# Patient Record
Sex: FEMALE | Race: WHITE | Employment: FULL TIME | URBAN - METROPOLITAN AREA
[De-identification: names, ages, dates, MRNs, and addresses within clinical notes are randomized per-mention and may not be internally consistent; named-entity substitution may affect disease eponyms.]

---

## 2018-01-10 ENCOUNTER — HOSPITAL ENCOUNTER (OUTPATIENT)
Dept: MRI IMAGING | Age: 68
Discharge: HOME | End: 2018-01-10
Attending: NEUROLOGICAL SURGERY
Payer: MEDICARE

## 2018-01-10 ENCOUNTER — HOSPITAL ENCOUNTER (OUTPATIENT)
Dept: GENERAL RADIOLOGY | Age: 68
Discharge: HOME | End: 2018-01-10
Attending: NEUROLOGICAL SURGERY
Payer: MEDICARE

## 2018-01-10 VITALS — BODY MASS INDEX: 21.63 KG/M2 | WEIGHT: 134 LBS

## 2018-01-10 DIAGNOSIS — M53.3 DISORDER OF SACRUM: ICD-10-CM

## 2018-01-10 DIAGNOSIS — M54.50 LUMBAGO: ICD-10-CM

## 2018-01-10 DIAGNOSIS — M54.16 LUMBAR RADICULOPATHY: ICD-10-CM

## 2018-01-10 PROCEDURE — 73502 X-RAY EXAM HIP UNI 2-3 VIEWS: CPT

## 2018-01-10 PROCEDURE — 72110 X-RAY EXAM L-2 SPINE 4/>VWS: CPT

## 2018-01-10 PROCEDURE — 72158 MRI LUMBAR SPINE W/O & W/DYE: CPT

## 2018-01-10 PROCEDURE — 74011250636 HC RX REV CODE- 250/636: Performed by: NEUROLOGICAL SURGERY

## 2018-01-10 PROCEDURE — A9585 GADOBUTROL INJECTION: HCPCS | Performed by: NEUROLOGICAL SURGERY

## 2018-01-10 RX ADMIN — GADOBUTROL 7.5 ML: 604.72 INJECTION INTRAVENOUS at 16:36

## 2023-01-25 ENCOUNTER — OFFICE VISIT (OUTPATIENT)
Dept: CARDIOLOGY CLINIC | Age: 73
End: 2023-01-25
Payer: MEDICARE

## 2023-01-25 VITALS — DIASTOLIC BLOOD PRESSURE: 62 MMHG | OXYGEN SATURATION: 98 % | HEART RATE: 136 BPM | SYSTOLIC BLOOD PRESSURE: 112 MMHG

## 2023-01-25 DIAGNOSIS — I48.92 ATRIAL FLUTTER, UNSPECIFIED TYPE (HCC): Primary | ICD-10-CM

## 2023-01-25 RX ORDER — METOPROLOL TARTRATE 25 MG/1
25 TABLET, FILM COATED ORAL 2 TIMES DAILY
Qty: 60 TABLET | Refills: 3 | Status: SHIPPED | OUTPATIENT
Start: 2023-01-25 | End: 2023-01-27 | Stop reason: SDUPTHER

## 2023-01-25 RX ORDER — PREGABALIN 100 MG/1
1 CAPSULE ORAL 2 TIMES DAILY
COMMUNITY
Start: 2022-10-27

## 2023-01-25 RX ORDER — DICLOFENAC SODIUM 75 MG/1
1 TABLET, DELAYED RELEASE ORAL DAILY
COMMUNITY
Start: 2023-01-03 | End: 2023-01-27 | Stop reason: ALTCHOICE

## 2023-01-25 RX ORDER — APIXABAN 5 MG/1
1 TABLET, FILM COATED ORAL 2 TIMES DAILY
COMMUNITY
Start: 2023-01-10 | End: 2023-01-27 | Stop reason: SDUPTHER

## 2023-01-25 RX ORDER — VENLAFAXINE HYDROCHLORIDE 37.5 MG/1
1 CAPSULE, EXTENDED RELEASE ORAL 2 TIMES DAILY
COMMUNITY
Start: 2023-01-17

## 2023-01-25 NOTE — PATIENT INSTRUCTIONS
Testing   Echo**call office 3-5 days after testing is completed for results** Please ensure testing is completed prior to scheduled follow up appointment. If it is not completed your appointment may be rescheduled**    New Medication/Medication Changes  Metoprolol tartrate 25 mg twice a day    **please allow 24-48 hrs for medication to be escribed to pharmacy** If you need any refills on medications please contact your pharmacy so that the request can be escribed to the provider for review seven to 10 days prior to being out of medication.       Other Testing  Referral to Dr Hanny Reno for A Flutter- they will contact you with a date and time for appointment after your paperwork has been reviewed  Retrieve CT report from DOD Provider     New Location Address- projected for the month of March 2023    [place] [id number]

## 2023-01-25 NOTE — PROGRESS NOTES
Identified pt with two pt identifiers(name and ). Reviewed record in preparation for visit and have obtained necessary documentation. Mic Levy presents today for   Chief Complaint   Patient presents with    New Patient     Abnormal EKG        Pt c/o MORALES and palpitations. Mic Levy preferred language for health care discussion is english/other. Personal Protective Equipment:   Personal Protective Equipment was used including: mask-surgical and hands-gloves. Patient was placed on no precaution(s). Patient was masked. Precautions:   Patient currently on None  Patient currently roomed with door closed. Is someone accompanying this pt? no    Is the patient using any DME equipment during [address]? no    Depression Screening:  3 most recent PHQ Screens 2023   Little interest or pleasure in doing things Not at all   Feeling down, depressed, irritable, or hopeless Not at all   Total Score PHQ 2 0       Learning Assessment:  No flowsheet data found. Abuse Screening:  Abuse Screening Questionnaire 2023   Do you ever feel afraid of your partner? N   Are you in a relationship with someone who physically or mentally threatens you? N   Is it safe for you to go home? Y       Fall Risk  Fall Risk Assessment, last 12 mths 2023   Able to walk? Yes   Fall in past 12 months? 0   Do you feel unsteady? 0   Are you worried about falling 0       Pt currently taking Anticoagulant therapy? yes  Pt currently taking Antiplatelet therapy? no    Coordination of Care:  1. Have you been to the ER, urgent care clinic since your last visit? Hospitalized since your last visit? no    2. Have you seen or consulted any other health care providers outside of the [address] [place] [address] since your last visit? Include any pap smears or colon screening. yes      Please see Red banners under Allergies and Med Rec to remove outside inquires.  All correct information has been verified with patient and added to chart.      Medication's patient's would liked removed has been marked not taking to be removed per Verbal order and read back per Nanda Crump, DO

## 2023-01-25 NOTE — PROGRESS NOTES
Rosanne    Chief Complaint   Patient presents with    New Patient     Abnormal EKG        HPI    Rosanne is a 68 y.o. female with remote h/o breast CA referred for newly recognized atrial flutter. She has been c/o lightheadedness, exercise intolerance, and shortness of breath, a feeling of agitation for several months now. She says when traveling over the holidays, also sometimes on her bike almost feels like she could black out. She mentioned to her doctor at [facility] but it wasn't until at routine check at heme/onc she was noted to be tachycardic and suggested to come here. They did start her on NOAC and sent her referral. She had already been sent to pulm for these complaints and thus far just benign pulm nodules but she says was going to be worked up for exercise induced asthma. She also mentions having a CT scan (of her chest?) that incidentally noted mild carotid disease. She comes today by herself on bike, in atrial flutter 130-140s. Her mother saw Dr. Lord Jimenez but his next appt was May 2023  Kait Gonzalez is Dr. Betancourt Daughters Dr. Davie Ortiz    Past Medical History:   Diagnosis Date    Breast cancer [facility])     Chronic back pain     Degenerative disc disease, cervical     Degenerative disc disease, lumbar     Encounter for long-term (current) use of high-risk medication 8/1/2012    Lumbar spondylosis        Past Surgical History:   Procedure Laterality Date    HX BREAST LUMPECTOMY  1999    HX BUNIONECTOMY  2011    right    HX LUMBAR DISKECTOMY      HX LUMBAR FUSION      HX OTHER SURGICAL  2007    right knee surgery to remove fibroid    HX MADY AND BSO  2000       Current Outpatient Medications   Medication Sig Dispense Refill    Eliquis 5 mg tablet Take 1 Tablet by mouth two (2) times a day. pregabalin (LYRICA) 100 mg capsule Take 1 Capsule by mouth two (2) times a day. TURMERIC PO Take 1,000 mg by mouth two (2) times a day.       metoprolol tartrate (LOPRESSOR) 25 mg tablet Take 1 Tablet by mouth two (2) times a day. 60 Tablet 3    diclofenac EC (VOLTAREN) 75 mg EC tablet Take 1 Tablet by mouth daily. venlafaxine-SR (EFFEXOR-XR) 37.5 mg capsule Take 1 Capsule by mouth two (2) times a day. CALCIUM CARBONATE (CALTRATE 600 PO) Take  by mouth. Allergies   Allergen Reactions    Tramadol Nausea and Vomiting    Oxycodone Nausea and Vomiting       Social History     Socioeconomic History    Marital status: SINGLE     Spouse name: Not on file    Number of children: Not on file    Years of education: Not on file    Highest education level: Not on file   Occupational History    Not on file   Tobacco Use    Smoking status: Never    Smokeless tobacco: Never   Substance and Sexual Activity    Alcohol use: Yes     Comment: 4-5 small glasses of wine per week    Drug use: No    Sexual activity: Yes     Birth control/protection: Surgical   Other Topics Concern    Not on file   Social History Narrative    Not on file     Social Determinants of Health     Financial Resource Strain: Not on file   Food Insecurity: Not on file   Transportation Needs: Not on file   Physical Activity: Not on file   Stress: Not on file   Social Connections: Not on file   Intimate Partner Violence: Not on file   Housing Stability: Not on file    She's a psychologist, single 3 dogs    FH: her mother had AFib and cared for her until she passed away at age 80    Review of Systems    15 pt Review of Systems is negative unless otherwise mentioned in the HPI.     Wt Readings from Last 3 Encounters:   01/10/18 60.8 kg (134 lb)   06/28/13 57.2 kg (126 lb)   04/04/13 55.6 kg (122 lb 9.6 oz)     Temp Readings from Last 3 Encounters:   11/30/12 96.6 °F (35.9 °C)   11/09/12 96.6 °F (35.9 °C)   08/21/12 97.2 °F (36.2 °C)     BP Readings from Last 3 Encounters:   01/25/23 112/62   06/28/13 143/64   04/04/13 133/64     Pulse Readings from Last 3 Encounters:   01/25/23 (!) 136   06/28/13 95   04/04/13 75       Physical Exam:    Visit Vitals  /62   Pulse (!) 136   SpO2 98%      Physical Exam  Constitutional:       General: She is not in acute distress. Appearance: She is not ill-appearing. HENT:      Head: Normocephalic and atraumatic. Eyes:      Pupils: Pupils are equal, round, and reactive to light. Cardiovascular:      Rate and Rhythm: Regular rhythm. Tachycardia present. Heart sounds: Normal heart sounds. No murmur heard. No friction rub. No gallop. Pulmonary:      Effort: Pulmonary effort is normal. No respiratory distress. Breath sounds: Normal breath sounds. No wheezing or rales. Chest:      Chest wall: No tenderness. Abdominal:      General: Bowel sounds are normal.      Palpations: Abdomen is soft. Musculoskeletal:         General: No tenderness. Skin:     General: Skin is warm and dry. Neurological:      Mental Status: She is alert and oriented to person, place, and time.        EKG today shows: atrial flutter 130s, similar compared to last    No results found for: TSH, TSH2, TSH3, TSHP, TSHEXT, TSHEXT  No results found for: NA, K, CL, CO2, AGAP, GLU, BUN, CREA, BUCR, GFRAA, GFRNA, CA, TBIL, TBILI, AP, TP, ALB, GLOB, AGRAT, ALT, AST      Impression and Plan:  Meli Saez is a 68 y.o. with:    Newly recognized symptomatic atrial flutter, CHADSVASC ~2 (age, F)  Remote h/o breast CA  Mild carotid disease (by CT)    Offered to schedule outpt ISMAEL/ DCCV with one of my partners at [facility] and she preferred not to  Refer to Dr. Bob Murrieta/ EP, for atrial flutter for likely rhythm control mgt  Already on NOAC which is a good idea for her at least now, as she likely should have DCCV  Will Rx- Metoprolol 25 mg BID in meantime  Echocardiogram  Significant time spent, dw pt strategies of treatment, indications risks benefits for anticoagulation  She has no bleeding issues  Will give her a follow up with me in ~3 months but can likely follow up with EP only long term  She has been educated to go to the ER if symptoms worsen/ do not improve  Please be careful and would recommend strenuous exercise until your appt    Thank you for allowing me to participate in the care of your patient, please do not hesitate to call with questions or concerns. Follow-up and Dispositions    Return in about 3 months (around 4/25/2023).      Kindest Regards,    Mathew Mcduffie, DO

## 2023-01-27 ENCOUNTER — TELEPHONE (OUTPATIENT)
Dept: CARDIOLOGY CLINIC | Age: 73
End: 2023-01-27

## 2023-01-27 RX ORDER — APIXABAN 5 MG/1
5 TABLET, FILM COATED ORAL 2 TIMES DAILY
Qty: 90 TABLET | Refills: 3 | Status: SHIPPED | OUTPATIENT
Start: 2023-01-27

## 2023-01-27 RX ORDER — METOPROLOL TARTRATE 25 MG/1
25 TABLET, FILM COATED ORAL 2 TIMES DAILY
Qty: 180 TABLET | Refills: 3 | Status: SHIPPED | OUTPATIENT
Start: 2023-01-27

## 2023-01-27 NOTE — TELEPHONE ENCOUNTER
PCP: Casey Ballesteros MD    Last appt: 1/25/2023  Future Appointments   Date Time Provider Demarco Pardo   2/2/2023 10:00 AM [facility] 1 [facility] AMB   4/19/2023 11:30 AM Corin May DO [facility] BS AMB       Requested Prescriptions     Pending Prescriptions Disp Refills    Eliquis 5 mg tablet 90 Tablet 1     Sig: Take 1 Tablet by mouth two (2) times a day. metoprolol tartrate (LOPRESSOR) 25 mg tablet 180 Tablet 1     Sig: Take 1 Tablet by mouth two (2) times a day.

## 2023-05-31 ENCOUNTER — OFFICE VISIT (OUTPATIENT)
Age: 73
End: 2023-05-31
Payer: MEDICARE

## 2023-05-31 VITALS — HEART RATE: 72 BPM | DIASTOLIC BLOOD PRESSURE: 62 MMHG | OXYGEN SATURATION: 96 % | SYSTOLIC BLOOD PRESSURE: 112 MMHG

## 2023-05-31 DIAGNOSIS — I43 TACHYCARDIA INDUCED CARDIOMYOPATHY (HCC): ICD-10-CM

## 2023-05-31 DIAGNOSIS — I48.92 ATRIAL FLUTTER, PAROXYSMAL (HCC): Primary | ICD-10-CM

## 2023-05-31 DIAGNOSIS — R00.0 TACHYCARDIA INDUCED CARDIOMYOPATHY (HCC): ICD-10-CM

## 2023-05-31 PROCEDURE — 99214 OFFICE O/P EST MOD 30 MIN: CPT | Performed by: INTERNAL MEDICINE

## 2023-05-31 PROCEDURE — 1090F PRES/ABSN URINE INCON ASSESS: CPT | Performed by: INTERNAL MEDICINE

## 2023-05-31 PROCEDURE — G8400 PT W/DXA NO RESULTS DOC: HCPCS | Performed by: INTERNAL MEDICINE

## 2023-05-31 PROCEDURE — 1123F ACP DISCUSS/DSCN MKR DOCD: CPT | Performed by: INTERNAL MEDICINE

## 2023-05-31 PROCEDURE — G8420 CALC BMI NORM PARAMETERS: HCPCS | Performed by: INTERNAL MEDICINE

## 2023-05-31 PROCEDURE — G8427 DOCREV CUR MEDS BY ELIG CLIN: HCPCS | Performed by: INTERNAL MEDICINE

## 2023-05-31 PROCEDURE — 3017F COLORECTAL CA SCREEN DOC REV: CPT | Performed by: INTERNAL MEDICINE

## 2023-05-31 PROCEDURE — 1036F TOBACCO NON-USER: CPT | Performed by: INTERNAL MEDICINE

## 2023-05-31 RX ORDER — VENLAFAXINE 37.5 MG/1
37.5 TABLET ORAL 2 TIMES DAILY
COMMUNITY

## 2023-05-31 RX ORDER — ASCORBIC ACID 500 MG
500 TABLET ORAL DAILY
COMMUNITY

## 2023-05-31 RX ORDER — PREGABALIN 75 MG/1
75 CAPSULE ORAL 2 TIMES DAILY
COMMUNITY

## 2023-05-31 ASSESSMENT — PATIENT HEALTH QUESTIONNAIRE - PHQ9
2. FEELING DOWN, DEPRESSED OR HOPELESS: 0
SUM OF ALL RESPONSES TO PHQ QUESTIONS 1-9: 0
1. LITTLE INTEREST OR PLEASURE IN DOING THINGS: 0
SUM OF ALL RESPONSES TO PHQ9 QUESTIONS 1 & 2: 0
SUM OF ALL RESPONSES TO PHQ QUESTIONS 1-9: 0

## 2023-05-31 NOTE — PROGRESS NOTES
Identified pt with two pt identifiers(name and ). Reviewed record in preparation for visit and have obtained necessary documentation. Damaris Brad presents today for   Chief Complaint   Patient presents with    Follow-up     3 month        Pt occ ROMANO. Damaris Salinas preferred language for health care discussion is english/other. Precautions:   Patient currently on None  Patient currently roomed with door closed. Is someone accompanying this pt? no    Is the patient using any DME equipment during [address]? No     Depression Screening:  completed    Abuse Screening:  completed    Fall Risk  completed    Pt currently taking Anticoagulant therapy? no  Pt currently taking Antiplatelet therapy? No     Coordination of Care:  1. Have you been to the ER, urgent care clinic since your last visit? Hospitalized since your last visit? No     2. Have you seen or consulted any other health care providers outside of the [address] [place] [address] since your last visit? Include any pap smears or colon screening. Yes.

## 2023-05-31 NOTE — PROGRESS NOTES
Ata    Chief Complaint   Patient presents with    New Patient     Abnormal EKG        HPI    Ata is a 68 y.o. female with remote h/o breast CA referred for newly recognized atrial flutter. She had been c/o lightheadedness, exercise intolerance, and shortness of breath, a feeling of agitation for several months now. She says when traveling over the holidays, also sometimes on her bike almost feels like she could black out. She mentioned to her doctor at [facility] but it wasn't until at routine check at heme/onc she was noted to be tachycardic and suggested to come here. They did start her on NOAC and sent her referral. She had already been sent to pulm for these complaints and thus far just benign pulm nodules but she says was going to be worked up for exercise induced asthma. She also mentions having a CT scan (of her chest?) that incidentally noted mild carotid disease. She comes today by herself on bike, in atrial flutter 130-140s. Her mother saw Dr. Maria Esther Gaytan but his next appt was May 2023  Matt Coley is Dr. Salmeron Anne Marie Dr. Denson Ser    I sent her to Dr. Afshan Williamson, she is s/p ablation and had a post procedure echo with EF 65% few weeks ago. She feels so much better. Past Medical History:   Diagnosis Date    Breast cancer ([address]     Chronic back pain     Degenerative disc disease, cervical     Degenerative disc disease, lumbar     Encounter for long-term (current) use of high-risk medication 8/1/2012    Lumbar spondylosis        Past Surgical History:   Procedure Laterality Date    HX BREAST LUMPECTOMY  1999    HX BUNIONECTOMY  2011    right    HX LUMBAR DISKECTOMY      HX LUMBAR FUSION      HX OTHER SURGICAL  2007    right knee surgery to remove fibroid    HX MENDEZ AND BSO  2000       Current Outpatient Medications   Medication Sig Dispense Refill    Eliquis 5 mg tablet Take 1 Tablet by mouth two (2) times a day.       pregabalin (LYRICA) 100 mg capsule Take 1 Capsule by

## 2023-11-17 ENCOUNTER — OFFICE VISIT (OUTPATIENT)
Age: 73
End: 2023-11-17
Payer: MEDICARE

## 2023-11-17 VITALS
WEIGHT: 135 LBS | OXYGEN SATURATION: 98 % | DIASTOLIC BLOOD PRESSURE: 70 MMHG | BODY MASS INDEX: 21.69 KG/M2 | HEIGHT: 66 IN | HEART RATE: 87 BPM | SYSTOLIC BLOOD PRESSURE: 114 MMHG

## 2023-11-17 DIAGNOSIS — R00.0 TACHYCARDIA INDUCED CARDIOMYOPATHY (HCC): ICD-10-CM

## 2023-11-17 DIAGNOSIS — I48.92 ATRIAL FLUTTER, PAROXYSMAL (HCC): Primary | ICD-10-CM

## 2023-11-17 DIAGNOSIS — I43 TACHYCARDIA INDUCED CARDIOMYOPATHY (HCC): ICD-10-CM

## 2023-11-17 PROBLEM — M20.009 ACQUIRED DEFORMITY OF FINGER: Status: ACTIVE | Noted: 2023-11-17

## 2023-11-17 PROBLEM — M50.30 DEGENERATION OF INTERVERTEBRAL DISC OF CERVICAL REGION: Status: ACTIVE | Noted: 2023-11-17

## 2023-11-17 PROBLEM — M25.511 CHRONIC PAIN OF BOTH SHOULDERS: Status: ACTIVE | Noted: 2023-08-24

## 2023-11-17 PROBLEM — M19.012 OSTEOARTHRITIS OF BILATERAL GLENOHUMERAL JOINTS: Status: ACTIVE | Noted: 2023-08-24

## 2023-11-17 PROBLEM — M18.12 PRIMARY OSTEOARTHRITIS OF FIRST CARPOMETACARPAL JOINT OF LEFT HAND: Status: ACTIVE | Noted: 2022-07-27

## 2023-11-17 PROBLEM — R06.09 DYSPNEA ON EXERTION: Status: ACTIVE | Noted: 2023-11-17

## 2023-11-17 PROBLEM — H40.019 CUPPING OF OPTIC DISC CONCURRENT WITH AND DUE TO OPEN ANGLE GLAUCOMA: Status: ACTIVE | Noted: 2023-11-17

## 2023-11-17 PROBLEM — H52.10 MYOPIA: Status: ACTIVE | Noted: 2023-11-17

## 2023-11-17 PROBLEM — R91.8 MULTIPLE NODULES OF LUNG: Status: ACTIVE | Noted: 2023-11-17

## 2023-11-17 PROBLEM — M23.305 DERANGEMENT OF MEDIAL MENISCUS: Status: ACTIVE | Noted: 2023-11-17

## 2023-11-17 PROBLEM — M17.12 UNILATERAL PRIMARY OSTEOARTHRITIS, LEFT KNEE: Status: ACTIVE | Noted: 2018-01-17

## 2023-11-17 PROBLEM — M54.30 SCIATICA: Status: ACTIVE | Noted: 2023-11-17

## 2023-11-17 PROBLEM — M81.0 OSTEOPOROSIS: Status: ACTIVE | Noted: 2023-11-17

## 2023-11-17 PROBLEM — M25.512 CHRONIC PAIN OF BOTH SHOULDERS: Status: ACTIVE | Noted: 2023-08-24

## 2023-11-17 PROBLEM — M85.80 OSTEOPENIA: Status: ACTIVE | Noted: 2023-11-17

## 2023-11-17 PROBLEM — R23.4 CHANGES IN SKIN TEXTURE: Status: ACTIVE | Noted: 2023-11-17

## 2023-11-17 PROBLEM — C50.919 MALIGNANT NEOPLASM OF BREAST (HCC): Status: ACTIVE | Noted: 2023-11-17

## 2023-11-17 PROBLEM — M25.549 ARTHRALGIA OF HAND: Status: ACTIVE | Noted: 2023-11-17

## 2023-11-17 PROBLEM — M72.2 PLANTAR FASCIITIS: Status: ACTIVE | Noted: 2023-11-17

## 2023-11-17 PROBLEM — H40.013 OPEN ANGLE WITH BORDERLINE FINDINGS, LOW RISK, BILATERAL: Status: ACTIVE | Noted: 2023-11-17

## 2023-11-17 PROBLEM — M25.551 PAIN OF RIGHT HIP JOINT: Status: ACTIVE | Noted: 2023-11-17

## 2023-11-17 PROBLEM — T79.A0XA COMPARTMENT SYNDROME (HCC): Status: ACTIVE | Noted: 2023-10-14

## 2023-11-17 PROBLEM — H47.239 GLAUCOMATOUS ATROPHY OF OPTIC DISC: Status: ACTIVE | Noted: 2023-11-17

## 2023-11-17 PROBLEM — G89.29 CHRONIC PAIN OF BOTH SHOULDERS: Status: ACTIVE | Noted: 2023-08-24

## 2023-11-17 PROBLEM — M19.011 OSTEOARTHRITIS OF BILATERAL GLENOHUMERAL JOINTS: Status: ACTIVE | Noted: 2023-08-24

## 2023-11-17 PROBLEM — M47.817 LUMBOSACRAL SPONDYLOSIS WITHOUT MYELOPATHY: Status: ACTIVE | Noted: 2020-10-06

## 2023-11-17 PROBLEM — S90.31XA HEMATOMA OF RIGHT FOOT: Status: ACTIVE | Noted: 2023-11-17

## 2023-11-17 PROBLEM — I78.1 NON-NEOPLASTIC NEVUS: Status: ACTIVE | Noted: 2023-11-17

## 2023-11-17 PROBLEM — E78.5 HYPERLIPIDEMIA: Status: ACTIVE | Noted: 2023-11-17

## 2023-11-17 PROBLEM — R69 OTHER ILL-DEFINED AND UNKNOWN CAUSES OF MORBIDITY AND MORTALITY: Status: ACTIVE | Noted: 2023-11-17

## 2023-11-17 PROBLEM — M21.619 BUNION: Status: ACTIVE | Noted: 2023-11-17

## 2023-11-17 PROBLEM — M53.3 SACROILIAC JOINT DYSFUNCTION OF BOTH SIDES: Status: ACTIVE | Noted: 2020-10-06

## 2023-11-17 PROBLEM — S99.912A LEFT ANKLE INJURY: Status: ACTIVE | Noted: 2023-11-17

## 2023-11-17 PROBLEM — H90.3 ASYMMETRICAL SENSORINEURAL HEARING LOSS: Status: ACTIVE | Noted: 2019-08-19

## 2023-11-17 PROBLEM — M22.2X9 PATELLOFEMORAL SYNDROME: Status: ACTIVE | Noted: 2023-11-17

## 2023-11-17 PROBLEM — L81.9 DISCOLORATION OF SKIN: Status: ACTIVE | Noted: 2023-11-17

## 2023-11-17 PROBLEM — M25.80 SESAMOIDITIS: Status: ACTIVE | Noted: 2023-11-17

## 2023-11-17 PROCEDURE — 3017F COLORECTAL CA SCREEN DOC REV: CPT | Performed by: INTERNAL MEDICINE

## 2023-11-17 PROCEDURE — G8484 FLU IMMUNIZE NO ADMIN: HCPCS | Performed by: INTERNAL MEDICINE

## 2023-11-17 PROCEDURE — G8399 PT W/DXA RESULTS DOCUMENT: HCPCS | Performed by: INTERNAL MEDICINE

## 2023-11-17 PROCEDURE — G8427 DOCREV CUR MEDS BY ELIG CLIN: HCPCS | Performed by: INTERNAL MEDICINE

## 2023-11-17 PROCEDURE — 1123F ACP DISCUSS/DSCN MKR DOCD: CPT | Performed by: INTERNAL MEDICINE

## 2023-11-17 PROCEDURE — 1036F TOBACCO NON-USER: CPT | Performed by: INTERNAL MEDICINE

## 2023-11-17 PROCEDURE — 99214 OFFICE O/P EST MOD 30 MIN: CPT | Performed by: INTERNAL MEDICINE

## 2023-11-17 PROCEDURE — G8420 CALC BMI NORM PARAMETERS: HCPCS | Performed by: INTERNAL MEDICINE

## 2023-11-17 PROCEDURE — 1090F PRES/ABSN URINE INCON ASSESS: CPT | Performed by: INTERNAL MEDICINE

## 2023-11-17 ASSESSMENT — PATIENT HEALTH QUESTIONNAIRE - PHQ9
1. LITTLE INTEREST OR PLEASURE IN DOING THINGS: 0
SUM OF ALL RESPONSES TO PHQ QUESTIONS 1-9: 0
SUM OF ALL RESPONSES TO PHQ9 QUESTIONS 1 & 2: 0
2. FEELING DOWN, DEPRESSED OR HOPELESS: 0
SUM OF ALL RESPONSES TO PHQ QUESTIONS 1-9: 0

## 2023-11-17 NOTE — PROGRESS NOTES
Citlalli Barnes presents today for   Chief Complaint   Patient presents with    6 Month Follow-Up    Dizziness       Citlalli Barnes preferred language for health care discussion is english/other. Is someone accompanying this pt? no    Is the patient using any DME equipment during OV? no    Depression Screening:  Depression: Not at risk (11/17/2023)    PHQ-2     PHQ-2 Score: 0        Learning Assessment:  Who is the primary learner? Patient    What is the preferred language for health care of the primary learner? ENGLISH    How does the primary learner prefer to learn new concepts? DEMONSTRATION    Answered By patient    Relationship to Learner SELF           Pt currently taking Anticoagulant therapy? no    Pt currently taking Antiplatelet therapy ? no      Coordination of Care:  1. Have you been to the ER, urgent care clinic since your last visit? Hospitalized since your last visit? no    2. Have you seen or consulted any other health care providers outside of the [address] [place] [address] since your last visit? Include any pap smears or colon screening.  no

## 2023-11-17 NOTE — PROGRESS NOTES
[address]    Chief Complaint   Patient presents with    New Patient     Abnormal EKG    afl    HPI    [address] is a 68 y.o. female with remote h/o breast CA originally referred for newly recognized atrial flutter. She was very symptomatic, presented in [place], almost passed out riding her bike to the appt. I sent her to Dr. Tabatha Crews, she is s/p ablation and had a post procedure echo with EF 65% few weeks ago. She feels so much better. I gave her a follow up appt to ensure she maintains NSR. She thinks so. She can feel a palp here and there sporadically but not her heart racing like it was. Past Medical History:   Diagnosis Date    Breast cancer ([address])     Chronic back pain     Degenerative disc disease, cervical     Degenerative disc disease, lumbar     Encounter for long-term (current) use of high-risk medication 8/1/2012    Lumbar spondylosis        Past Surgical History:   Procedure Laterality Date    HX BREAST LUMPECTOMY  1999    HX BUNIONECTOMY  2011    right    HX LUMBAR DISKECTOMY      HX LUMBAR FUSION      HX OTHER SURGICAL  2007    right knee surgery to remove fibroid    HX MENDEZ AND BSO  2000       Current Outpatient Medications   Medication Sig Dispense Refill    Eliquis 5 mg tablet Take 1 Tablet by mouth two (2) times a day. pregabalin (LYRICA) 100 mg capsule Take 1 Capsule by mouth two (2) times a day. TURMERIC PO Take 1,000 mg by mouth two (2) times a day. metoprolol tartrate (LOPRESSOR) 25 mg tablet Take 1 Tablet by mouth two (2) times a day. 60 Tablet 3    diclofenac EC (VOLTAREN) 75 mg EC tablet Take 1 Tablet by mouth daily. venlafaxine-SR (EFFEXOR-XR) 37.5 mg capsule Take 1 Capsule by mouth two (2) times a day. CALCIUM CARBONATE (CALTRATE 600 PO) Take  by mouth.            Allergies   Allergen Reactions    Tramadol Nausea and Vomiting    Oxycodone Nausea and Vomiting       Social History     Socioeconomic History    Marital status: SINGLE     Spouse